# Patient Record
Sex: MALE | Race: OTHER | NOT HISPANIC OR LATINO | URBAN - METROPOLITAN AREA
[De-identification: names, ages, dates, MRNs, and addresses within clinical notes are randomized per-mention and may not be internally consistent; named-entity substitution may affect disease eponyms.]

---

## 2022-11-10 ENCOUNTER — HOSPITAL ENCOUNTER (EMERGENCY)
Facility: HOSPITAL | Age: 37
Discharge: HOME | End: 2022-11-10
Attending: STUDENT IN AN ORGANIZED HEALTH CARE EDUCATION/TRAINING PROGRAM

## 2022-11-10 VITALS
BODY MASS INDEX: 25.6 KG/M2 | TEMPERATURE: 98 F | SYSTOLIC BLOOD PRESSURE: 134 MMHG | DIASTOLIC BLOOD PRESSURE: 91 MMHG | RESPIRATION RATE: 15 BRPM | WEIGHT: 163.13 LBS | HEIGHT: 67 IN | OXYGEN SATURATION: 100 % | HEART RATE: 63 BPM

## 2022-11-10 DIAGNOSIS — T78.40XA ALLERGIC REACTION, INITIAL ENCOUNTER: Primary | ICD-10-CM

## 2022-11-10 PROCEDURE — 63600175 PHARM REV CODE 636 W HCPCS

## 2022-11-10 PROCEDURE — 25000003 PHARM REV CODE 250

## 2022-11-10 PROCEDURE — 99284 EMERGENCY DEPT VISIT MOD MDM: CPT

## 2022-11-10 RX ORDER — DIPHENHYDRAMINE HCL 25 MG
25 CAPSULE ORAL EVERY 4 HOURS PRN
Qty: 20 CAPSULE | Refills: 0 | Status: SHIPPED | OUTPATIENT
Start: 2022-11-10 | End: 2022-11-15

## 2022-11-10 RX ORDER — PREDNISONE 20 MG/1
60 TABLET ORAL
Status: COMPLETED | OUTPATIENT
Start: 2022-11-10 | End: 2022-11-10

## 2022-11-10 RX ORDER — FAMOTIDINE 20 MG/1
20 TABLET, FILM COATED ORAL
Status: COMPLETED | OUTPATIENT
Start: 2022-11-10 | End: 2022-11-10

## 2022-11-10 RX ORDER — DIPHENHYDRAMINE HCL 25 MG
50 CAPSULE ORAL
Status: COMPLETED | OUTPATIENT
Start: 2022-11-10 | End: 2022-11-10

## 2022-11-10 RX ORDER — FAMOTIDINE 20 MG/1
20 TABLET, FILM COATED ORAL 2 TIMES DAILY
Qty: 10 TABLET | Refills: 0 | Status: SHIPPED | OUTPATIENT
Start: 2022-11-10 | End: 2022-11-15

## 2022-11-10 RX ORDER — PREDNISONE 20 MG/1
40 TABLET ORAL DAILY
Qty: 10 TABLET | Refills: 0 | Status: SHIPPED | OUTPATIENT
Start: 2022-11-10 | End: 2022-11-15

## 2022-11-10 RX ADMIN — DIPHENHYDRAMINE HYDROCHLORIDE 50 MG: 25 CAPSULE ORAL at 06:11

## 2022-11-10 RX ADMIN — FAMOTIDINE 20 MG: 20 TABLET, FILM COATED ORAL at 06:11

## 2022-11-10 RX ADMIN — PREDNISONE 60 MG: 20 TABLET ORAL at 06:11

## 2022-11-10 NOTE — Clinical Note
"Alexey"Dominguez Mehta was seen and treated in our emergency department on 11/10/2022.  He may return to work on 11/11/2022.       If you have any questions or concerns, please don't hesitate to call.      Kellie Pinon PA-C"

## 2022-11-11 NOTE — DISCHARGE INSTRUCTIONS

## 2022-11-11 NOTE — ED PROVIDER NOTES
"Encounter Date: 11/10/2022       History     Chief Complaint   Patient presents with    Allergic Reaction     The patient reports that after eating chicken for lunch today, he started having hives and itching all over his body, numbness, and sob. He reports taking 2 Benadryls and "an antihistamine" at that time. Denies chest pain, sob upon ED arrival.      37-year-old male with no past medical history presents to ED for emergent evaluation after possible allergic reaction at 1:00 p.m. He states that he was eating chicken for lunch and then all of a sudden he noticed generalized itching and  shortness of breath.  He states his boss gave him 2 Benadryl at 2:00 p.m. today.  He denies any symptoms at this time.  Patient denies any rash, chest pain, shortness of breath, fever, chills, abdominal pain, nausea, vomiting, diarrhea, dysuria, hematuria.  No other symptoms reported.    The history is provided by the patient. No  was used.   Review of patient's allergies indicates:  No Known Allergies  History reviewed. No pertinent past medical history.  History reviewed. No pertinent surgical history.  History reviewed. No pertinent family history.  Social History     Tobacco Use    Smoking status: Never    Smokeless tobacco: Never     Review of Systems   Constitutional:  Negative for chills and fever.   HENT:  Negative for congestion, ear pain, rhinorrhea and sore throat.    Eyes:  Negative for redness.   Respiratory:  Positive for shortness of breath. Negative for cough.    Cardiovascular:  Negative for chest pain.   Gastrointestinal:  Negative for abdominal pain, diarrhea, nausea and vomiting.   Genitourinary:  Negative for decreased urine volume, difficulty urinating, dysuria, frequency, hematuria and urgency.   Musculoskeletal:  Negative for back pain and neck pain.   Skin:  Positive for rash.   Neurological:  Negative for headaches.   Psychiatric/Behavioral:  Negative for confusion.      Physical Exam "     Initial Vitals [11/10/22 1740]   BP Pulse Resp Temp SpO2   (!) 142/97 78 16 98.9 °F (37.2 °C) 100 %      MAP       --         Physical Exam    Nursing note and vitals reviewed.  Constitutional: He appears well-developed and well-nourished. He is not diaphoretic.  Non-toxic appearance. No distress.   HENT:   Head: Normocephalic and atraumatic.   Right Ear: External ear normal.   Left Ear: External ear normal.   Nose: Nose normal.   Mouth/Throat: Uvula is midline and oropharynx is clear and moist.   No throat swelling.  No tongue swelling.   Eyes: Conjunctivae and EOM are normal.   Neck: Neck supple.   Normal range of motion.   Full passive range of motion without pain.     Cardiovascular:            Pulses:       Radial pulses are 2+ on the right side and 2+ on the left side.   Musculoskeletal:      Cervical back: Full passive range of motion without pain, normal range of motion and neck supple. No rigidity.     Neurological: He is alert.   Skin: Skin is warm and dry. No rash noted.       ED Course   Procedures  Labs Reviewed - No data to display       Imaging Results    None          Medications   diphenhydrAMINE capsule 50 mg (50 mg Oral Given 11/10/22 1831)   predniSONE tablet 60 mg (60 mg Oral Given 11/10/22 1831)   famotidine tablet 20 mg (20 mg Oral Given 11/10/22 1831)     Medical Decision Making:   ED Management:  This is a 37-year-old male with no past medical history presents to ED for emergent evaluation after possible allergic reaction at 1:00 p.m. On physical exam, patient is well-appearing and in no acute distress.  Nontoxic appearing.  Lungs are clear to auscultation bilaterally.  Abdomen is soft and nontender.  No guarding, rigidity, rebound.  2+ radial pulses bilaterally.  Posterior oropharynx is not erythematous.  No edema or exudate.  Uvula midline.  No throat swelling.  No tongue swelling.  Benadryl, prednisone, and Pepcid ordered.  Will reassess.  Upon reassessment, patient denies any  symptoms.  Will discharge patient on Benadryl, prednisone, and Pepcid.  Urged prompt follow-up with PCP for further evaluation.    Strict return precautions given. I discussed with the patient/family the diagnosis, treatment plan, indications for return to the emergency department, and for expected follow-up. The patient/family verbalized an understanding. The patient/family is asked if there are any questions or concerns. We discuss the case, until all issues are addressed to the patient/family's satisfaction. Patient/family understands and is agreeable to the plan. Patient is stable and ready for discharge.                          Clinical Impression:   Final diagnoses:  [T78.40XA] Allergic reaction, initial encounter (Primary)      ED Disposition Condition    Discharge Stable          ED Prescriptions       Medication Sig Dispense Start Date End Date Auth. Provider    diphenhydrAMINE (BENADRYL) 25 mg capsule Take 1 capsule (25 mg total) by mouth every 4 (four) hours as needed for Itching or Allergies. 20 capsule 11/10/2022 11/15/2022 Kellie Pinon PA-C    predniSONE (DELTASONE) 20 MG tablet Take 2 tablets (40 mg total) by mouth once daily. for 5 days 10 tablet 11/10/2022 11/15/2022 Kellie Pinon PA-C    famotidine (PEPCID) 20 MG tablet Take 1 tablet (20 mg total) by mouth 2 (two) times daily. for 5 days 10 tablet 11/10/2022 11/15/2022 Kellie Pinon PA-C          Follow-up Information       Follow up With Specialties Details Why Contact Info    [facility] ([place], [place])  Schedule an appointment as soon as possible for a visit in 2 days for further evaluation [address] [facility] [address]  [phone]      [facility] Emergency Dept Emergency Medicine In 2 days If symptoms worsen [address]  [facility] [address] [phone]             Kellie Pinon PA-C  11/10/22 1942

## 2022-11-11 NOTE — ED TRIAGE NOTES
Patient arrived by EMS. Per EM patient was eating chicken and arm started to feel funny. Per patient allergic reaction while eating chicken. Patient denies difficulty breathing spo2 100% on RA